# Patient Record
Sex: FEMALE | Race: WHITE | Employment: OTHER | URBAN - METROPOLITAN AREA
[De-identification: names, ages, dates, MRNs, and addresses within clinical notes are randomized per-mention and may not be internally consistent; named-entity substitution may affect disease eponyms.]

---

## 2019-12-26 NOTE — PROGRESS NOTES
Phone message left on only # listed,to call PAT dept at [phone]  for history review and surgery instructions.

## 2019-12-27 RX ORDER — NORETHINDRONE ACETATE AND ETHINYL ESTRADIOL 1; .02 MG/1; MG/1
1 TABLET ORAL EVERY EVENING
COMMUNITY

## 2019-12-27 NOTE — PROGRESS NOTES
C-Difficile admission screening and protocol:     * Admitted with diarrhea? no     *Prior history of C-Diff. In last 3 months? no     *Antibiotic use in the past 6-8 weeks? no     *Prior hospitalization or nursing home in the last month?  no        [address] time___1130 per pt_________        Surgery time___1300______    Take the following medications with a sip of water:n/a    Do not eat or drink anything after 12:00 midnight prior to your surgery. This includes water chewing gum, mints and ice chips. You may brush your teeth and gargle the morning of your surgery, but do not swallow the water     Please see your family doctor/pediatrician for a history and physical and/or concerning medications. Bring any test results/reports from your physicians office. If you are under the care of a heart doctor or specialist doctor, please be aware that you may be asked to them for clearance    You may be asked to stop blood thinners such as Coumadin, Plavix, Fragmin, Lovenox, etc., or any anti-inflammatories such as:  Aspirin, Ibuprofen, Advil, Naproxen prior to your surgery. We also ask that you stop any OTC medications such as fish oil, vitamin E, glucosamine, garlic, Multivitamins, COQ 10, etc.    We ask that you do not smoke 24 hours prior to surgery  We ask that you do not  drink any alcoholic beverages 24 hours prior to surgery     You must make arrangements for a responsible adult to take you home after your surgery. For your safety you will not be allowed to leave alone or drive yourself home. Your surgery will be cancelled if you do not have a ride home. Also for your safety, it is strongly suggested that someone stay with you the first 24 hours after your surgery. A parent or legal guardian must accompany a child scheduled for surgery and plan to stay at the hospital until the child is discharged.     Please do not bring other children with [phone]  Please note these are generalized instructions for all surgical cases, you may be provided with more specific instructions according to your surgery.

## 2019-12-30 ENCOUNTER — ANESTHESIA EVENT (OUTPATIENT)
Dept: OPERATING ROOM | Age: 19
End: 2019-12-30
Payer: COMMERCIAL

## 2019-12-31 ENCOUNTER — ANESTHESIA (OUTPATIENT)
Dept: OPERATING ROOM | Age: 19
End: 2019-12-31
Payer: COMMERCIAL

## 2019-12-31 ENCOUNTER — HOSPITAL ENCOUNTER (OUTPATIENT)
Age: 19
Setting detail: OUTPATIENT SURGERY
Discharge: HOME | End: 2019-12-31
Attending: OBSTETRICS & GYNECOLOGY | Admitting: OBSTETRICS & GYNECOLOGY
Payer: COMMERCIAL

## 2019-12-31 VITALS
HEIGHT: 64 IN | BODY MASS INDEX: 18.78 KG/M2 | HEART RATE: 56 BPM | RESPIRATION RATE: 16 BRPM | DIASTOLIC BLOOD PRESSURE: 67 MMHG | TEMPERATURE: 98.5 F | SYSTOLIC BLOOD PRESSURE: 103 MMHG | OXYGEN SATURATION: 100 % | WEIGHT: 110 LBS

## 2019-12-31 VITALS
DIASTOLIC BLOOD PRESSURE: 54 MMHG | SYSTOLIC BLOOD PRESSURE: 92 MMHG | OXYGEN SATURATION: 98 % | TEMPERATURE: 95.5 F | RESPIRATION RATE: 7 BRPM

## 2019-12-31 PROBLEM — R10.2 PELVIC PAIN IN FEMALE: Status: ACTIVE | Noted: 2019-12-31

## 2019-12-31 LAB
ABO/RH: NORMAL
ANTIBODY SCREEN: NORMAL
PREGNANCY, URINE: NEGATIVE

## 2019-12-31 PROCEDURE — 86900 BLOOD TYPING SEROLOGIC ABO: CPT

## 2019-12-31 PROCEDURE — 2709999900 HC NON-CHARGEABLE SUPPLY: Performed by: OBSTETRICS & GYNECOLOGY

## 2019-12-31 PROCEDURE — 2580000003 HC RX 258: Performed by: ANESTHESIOLOGY

## 2019-12-31 PROCEDURE — 2720000010 HC SURG SUPPLY STERILE: Performed by: OBSTETRICS & GYNECOLOGY

## 2019-12-31 PROCEDURE — 86901 BLOOD TYPING SEROLOGIC RH(D): CPT

## 2019-12-31 PROCEDURE — 3600000004 HC SURGERY LEVEL 4 BASE: Performed by: OBSTETRICS & GYNECOLOGY

## 2019-12-31 PROCEDURE — 86850 RBC ANTIBODY SCREEN: CPT

## 2019-12-31 PROCEDURE — 3700000001 HC ADD 15 MINUTES (ANESTHESIA): Performed by: OBSTETRICS & GYNECOLOGY

## 2019-12-31 PROCEDURE — 2580000003 HC RX 258: Performed by: OBSTETRICS & GYNECOLOGY

## 2019-12-31 PROCEDURE — 6360000002 HC RX W HCPCS: Performed by: NURSE ANESTHETIST, CERTIFIED REGISTERED

## 2019-12-31 PROCEDURE — 84703 CHORIONIC GONADOTROPIN ASSAY: CPT

## 2019-12-31 PROCEDURE — 7100000011 HC PHASE II RECOVERY - ADDTL 15 MIN: Performed by: OBSTETRICS & GYNECOLOGY

## 2019-12-31 PROCEDURE — 2500000003 HC RX 250 WO HCPCS: Performed by: NURSE ANESTHETIST, CERTIFIED REGISTERED

## 2019-12-31 PROCEDURE — 2500000003 HC RX 250 WO HCPCS: Performed by: OBSTETRICS & GYNECOLOGY

## 2019-12-31 PROCEDURE — 7100000000 HC PACU RECOVERY - FIRST 15 MIN: Performed by: OBSTETRICS & GYNECOLOGY

## 2019-12-31 PROCEDURE — 3600000014 HC SURGERY LEVEL 4 ADDTL 15MIN: Performed by: OBSTETRICS & GYNECOLOGY

## 2019-12-31 PROCEDURE — 6360000002 HC RX W HCPCS: Performed by: ANESTHESIOLOGY

## 2019-12-31 PROCEDURE — 7100000010 HC PHASE II RECOVERY - FIRST 15 MIN: Performed by: OBSTETRICS & GYNECOLOGY

## 2019-12-31 PROCEDURE — 7100000001 HC PACU RECOVERY - ADDTL 15 MIN: Performed by: OBSTETRICS & GYNECOLOGY

## 2019-12-31 PROCEDURE — 3700000000 HC ANESTHESIA ATTENDED CARE: Performed by: OBSTETRICS & GYNECOLOGY

## 2019-12-31 RX ORDER — SODIUM CHLORIDE 9 MG/ML
INJECTION, SOLUTION INTRAVENOUS CONTINUOUS
Status: DISCONTINUED | OUTPATIENT
Start: 2019-12-31 | End: 2019-12-31 | Stop reason: HOSPADM

## 2019-12-31 RX ORDER — MEPERIDINE HYDROCHLORIDE 25 MG/ML
12.5 INJECTION INTRAMUSCULAR; INTRAVENOUS; SUBCUTANEOUS EVERY 5 MIN PRN
Status: DISCONTINUED | OUTPATIENT
Start: 2019-12-31 | End: 2019-12-31 | Stop reason: HOSPADM

## 2019-12-31 RX ORDER — KETOROLAC TROMETHAMINE 30 MG/ML
30 INJECTION, SOLUTION INTRAMUSCULAR; INTRAVENOUS ONCE
Status: DISCONTINUED | OUTPATIENT
Start: 2019-12-31 | End: 2019-12-31 | Stop reason: HOSPADM

## 2019-12-31 RX ORDER — ROCURONIUM BROMIDE 10 MG/ML
INJECTION, SOLUTION INTRAVENOUS PRN
Status: DISCONTINUED | OUTPATIENT
Start: 2019-12-31 | End: 2019-12-31 | Stop reason: SDUPTHER

## 2019-12-31 RX ORDER — SUCCINYLCHOLINE/SOD CL,ISO/PF 200MG/10ML
SYRINGE (ML) INTRAVENOUS PRN
Status: DISCONTINUED | OUTPATIENT
Start: 2019-12-31 | End: 2019-12-31 | Stop reason: SDUPTHER

## 2019-12-31 RX ORDER — SODIUM CHLORIDE 0.9 % (FLUSH) 0.9 %
10 SYRINGE (ML) INJECTION PRN
Status: DISCONTINUED | OUTPATIENT
Start: 2019-12-31 | End: 2019-12-31 | Stop reason: HOSPADM

## 2019-12-31 RX ORDER — LABETALOL HYDROCHLORIDE 5 MG/ML
5 INJECTION, SOLUTION INTRAVENOUS EVERY 10 MIN PRN
Status: DISCONTINUED | OUTPATIENT
Start: 2019-12-31 | End: 2019-12-31 | Stop reason: HOSPADM

## 2019-12-31 RX ORDER — SODIUM CHLORIDE 0.9 % (FLUSH) 0.9 %
10 SYRINGE (ML) INJECTION EVERY 12 HOURS SCHEDULED
Status: DISCONTINUED | OUTPATIENT
Start: 2019-12-31 | End: 2019-12-31 | Stop reason: HOSPADM

## 2019-12-31 RX ORDER — MAGNESIUM HYDROXIDE 1200 MG/15ML
LIQUID ORAL CONTINUOUS PRN
Status: COMPLETED | OUTPATIENT
Start: 2019-12-31 | End: 2019-12-31

## 2019-12-31 RX ORDER — OXYCODONE HYDROCHLORIDE AND ACETAMINOPHEN 5; 325 MG/1; MG/1
1 TABLET ORAL PRN
Status: DISCONTINUED | OUTPATIENT
Start: 2019-12-31 | End: 2019-12-31 | Stop reason: HOSPADM

## 2019-12-31 RX ORDER — FENTANYL CITRATE 50 UG/ML
INJECTION, SOLUTION INTRAMUSCULAR; INTRAVENOUS PRN
Status: DISCONTINUED | OUTPATIENT
Start: 2019-12-31 | End: 2019-12-31 | Stop reason: SDUPTHER

## 2019-12-31 RX ORDER — DEXAMETHASONE SODIUM PHOSPHATE 4 MG/ML
INJECTION, SOLUTION INTRA-ARTICULAR; INTRALESIONAL; INTRAMUSCULAR; INTRAVENOUS; SOFT TISSUE PRN
Status: DISCONTINUED | OUTPATIENT
Start: 2019-12-31 | End: 2019-12-31 | Stop reason: SDUPTHER

## 2019-12-31 RX ORDER — MIDAZOLAM HYDROCHLORIDE 1 MG/ML
INJECTION INTRAMUSCULAR; INTRAVENOUS PRN
Status: DISCONTINUED | OUTPATIENT
Start: 2019-12-31 | End: 2019-12-31 | Stop reason: SDUPTHER

## 2019-12-31 RX ORDER — DIPHENHYDRAMINE HYDROCHLORIDE 50 MG/ML
INJECTION INTRAMUSCULAR; INTRAVENOUS PRN
Status: DISCONTINUED | OUTPATIENT
Start: 2019-12-31 | End: 2019-12-31 | Stop reason: SDUPTHER

## 2019-12-31 RX ORDER — PROPOFOL 10 MG/ML
INJECTION, EMULSION INTRAVENOUS PRN
Status: DISCONTINUED | OUTPATIENT
Start: 2019-12-31 | End: 2019-12-31 | Stop reason: SDUPTHER

## 2019-12-31 RX ORDER — MORPHINE SULFATE 2 MG/ML
2 INJECTION, SOLUTION INTRAMUSCULAR; INTRAVENOUS EVERY 5 MIN PRN
Status: DISCONTINUED | OUTPATIENT
Start: 2019-12-31 | End: 2019-12-31 | Stop reason: HOSPADM

## 2019-12-31 RX ORDER — LIDOCAINE HYDROCHLORIDE 20 MG/ML
INJECTION, SOLUTION EPIDURAL; INFILTRATION; INTRACAUDAL; PERINEURAL PRN
Status: DISCONTINUED | OUTPATIENT
Start: 2019-12-31 | End: 2019-12-31 | Stop reason: SDUPTHER

## 2019-12-31 RX ORDER — OXYCODONE HYDROCHLORIDE AND ACETAMINOPHEN 5; 325 MG/1; MG/1
2 TABLET ORAL PRN
Status: DISCONTINUED | OUTPATIENT
Start: 2019-12-31 | End: 2019-12-31 | Stop reason: HOSPADM

## 2019-12-31 RX ORDER — BUPIVACAINE HYDROCHLORIDE 5 MG/ML
INJECTION, SOLUTION EPIDURAL; INTRACAUDAL
Status: COMPLETED | OUTPATIENT
Start: 2019-12-31 | End: 2019-12-31

## 2019-12-31 RX ORDER — HYDRALAZINE HYDROCHLORIDE 20 MG/ML
5 INJECTION INTRAMUSCULAR; INTRAVENOUS
Status: DISCONTINUED | OUTPATIENT
Start: 2019-12-31 | End: 2019-12-31 | Stop reason: HOSPADM

## 2019-12-31 RX ORDER — ONDANSETRON 2 MG/ML
INJECTION INTRAMUSCULAR; INTRAVENOUS PRN
Status: DISCONTINUED | OUTPATIENT
Start: 2019-12-31 | End: 2019-12-31 | Stop reason: SDUPTHER

## 2019-12-31 RX ORDER — ONDANSETRON 2 MG/ML
4 INJECTION INTRAMUSCULAR; INTRAVENOUS
Status: DISCONTINUED | OUTPATIENT
Start: 2019-12-31 | End: 2019-12-31 | Stop reason: HOSPADM

## 2019-12-31 RX ORDER — MORPHINE SULFATE 2 MG/ML
1 INJECTION, SOLUTION INTRAMUSCULAR; INTRAVENOUS EVERY 5 MIN PRN
Status: DISCONTINUED | OUTPATIENT
Start: 2019-12-31 | End: 2019-12-31 | Stop reason: HOSPADM

## 2019-12-31 RX ADMIN — SODIUM CHLORIDE: 9 INJECTION, SOLUTION INTRAVENOUS at 12:17

## 2019-12-31 RX ADMIN — ONDANSETRON 4 MG: 2 INJECTION INTRAMUSCULAR; INTRAVENOUS at 13:18

## 2019-12-31 RX ADMIN — MIDAZOLAM 2 MG: 1 INJECTION INTRAMUSCULAR; INTRAVENOUS at 13:06

## 2019-12-31 RX ADMIN — MEPERIDINE HYDROCHLORIDE 12.5 MG: 25 INJECTION INTRAMUSCULAR; INTRAVENOUS; SUBCUTANEOUS at 14:09

## 2019-12-31 RX ADMIN — Medication 160 MG: at 13:11

## 2019-12-31 RX ADMIN — LIDOCAINE HYDROCHLORIDE 100 MG: 20 INJECTION, SOLUTION EPIDURAL; INFILTRATION; INTRACAUDAL; PERINEURAL at 13:10

## 2019-12-31 RX ADMIN — FENTANYL CITRATE 100 MCG: 50 INJECTION INTRAMUSCULAR; INTRAVENOUS at 13:10

## 2019-12-31 RX ADMIN — ROCURONIUM BROMIDE 20 MG: 10 INJECTION INTRAVENOUS at 13:18

## 2019-12-31 RX ADMIN — SUGAMMADEX 200 MG: 100 INJECTION, SOLUTION INTRAVENOUS at 13:34

## 2019-12-31 RX ADMIN — SODIUM CHLORIDE: 9 INJECTION, SOLUTION INTRAVENOUS at 14:26

## 2019-12-31 RX ADMIN — DIPHENHYDRAMINE HYDROCHLORIDE 12.5 MG: 50 INJECTION, SOLUTION INTRAMUSCULAR; INTRAVENOUS at 13:18

## 2019-12-31 RX ADMIN — HYDROMORPHONE HYDROCHLORIDE 0.5 MG: 1 INJECTION, SOLUTION INTRAMUSCULAR; INTRAVENOUS; SUBCUTANEOUS at 14:17

## 2019-12-31 RX ADMIN — PROPOFOL 200 MG: 10 INJECTION, EMULSION INTRAVENOUS at 13:10

## 2019-12-31 RX ADMIN — FAMOTIDINE 20 MG: 10 INJECTION, SOLUTION INTRAVENOUS at 13:18

## 2019-12-31 RX ADMIN — DEXAMETHASONE SODIUM PHOSPHATE 10 MG: 4 INJECTION, SOLUTION INTRAMUSCULAR; INTRAVENOUS at 13:18

## 2019-12-31 ASSESSMENT — PULMONARY FUNCTION TESTS
PIF_VALUE: 13
PIF_VALUE: 10
PIF_VALUE: 13
PIF_VALUE: 1
PIF_VALUE: 10
PIF_VALUE: 10
PIF_VALUE: 2
PIF_VALUE: 13
PIF_VALUE: 14
PIF_VALUE: 10
PIF_VALUE: 11
PIF_VALUE: 10
PIF_VALUE: 2
PIF_VALUE: 18
PIF_VALUE: 2
PIF_VALUE: 10
PIF_VALUE: 1
PIF_VALUE: 0
PIF_VALUE: 11
PIF_VALUE: 13
PIF_VALUE: 10
PIF_VALUE: 5
PIF_VALUE: 13
PIF_VALUE: 10
PIF_VALUE: 14
PIF_VALUE: 10
PIF_VALUE: 10
PIF_VALUE: 1
PIF_VALUE: 13
PIF_VALUE: 10
PIF_VALUE: 13

## 2019-12-31 ASSESSMENT — PAIN - FUNCTIONAL ASSESSMENT: PAIN_FUNCTIONAL_ASSESSMENT: 0-10

## 2019-12-31 ASSESSMENT — PAIN DESCRIPTION - PAIN TYPE
TYPE: SURGICAL PAIN

## 2019-12-31 ASSESSMENT — PAIN DESCRIPTION - PROGRESSION
CLINICAL_PROGRESSION: NOT CHANGED
CLINICAL_PROGRESSION: NOT CHANGED
CLINICAL_PROGRESSION: GRADUALLY IMPROVING
CLINICAL_PROGRESSION: GRADUALLY IMPROVING

## 2019-12-31 ASSESSMENT — PAIN DESCRIPTION - DESCRIPTORS
DESCRIPTORS: CRAMPING
DESCRIPTORS: ACHING;DISCOMFORT
DESCRIPTORS: ACHING
DESCRIPTORS: ACHING
DESCRIPTORS: CRAMPING;PRESSURE

## 2019-12-31 ASSESSMENT — PAIN SCALES - GENERAL
PAINLEVEL_OUTOF10: 7
PAINLEVEL_OUTOF10: 4
PAINLEVEL_OUTOF10: 5
PAINLEVEL_OUTOF10: 7
PAINLEVEL_OUTOF10: 4

## 2019-12-31 ASSESSMENT — PAIN DESCRIPTION - FREQUENCY
FREQUENCY: CONTINUOUS

## 2019-12-31 ASSESSMENT — PAIN DESCRIPTION - ORIENTATION
ORIENTATION: RIGHT;LOWER
ORIENTATION: LOWER
ORIENTATION: RIGHT;LOWER

## 2019-12-31 ASSESSMENT — PAIN DESCRIPTION - LOCATION
LOCATION: ABDOMEN

## 2019-12-31 ASSESSMENT — PAIN DESCRIPTION - ONSET
ONSET: ON-GOING

## 2019-12-31 ASSESSMENT — PAIN SCALES - WONG BAKER: WONGBAKER_NUMERICALRESPONSE: 4

## 2019-12-31 NOTE — PROGRESS NOTES
Vaginal Sweep Documentation     Intraop skin prep sponge count correct, verified by BG and NP. Vaginal sweep performed by Dr Sammy Woodall at 0693. No foreign objects or vaginal tears noted.

## 2019-12-31 NOTE — OP NOTE
OPERATIVE NOTE    Date of surgery:   Preoperative Diagnosis: 1. Chronic pelvic pain  Postoperative Diagnosis: SAME  Procedure: 1. Diagnostic laparoscopy  Surgeon: Dr Haley Benitez  Anesthesia: General  Antibiotics: none indicated  Findings: Anteverted uterus on bimanual examination with good mobility. Normal appearing uterus, tubes, and ovaries bilaterally on laparoscopy. No endometriotic implants or adhesions noted. Complications: none  Specimens: none  Urine Output: less than 50ml mL  Estimated blood loss: less than 5ml mL    Indications: The patient is a 21 yo female with a long standing history of chronic pelvic pain who has failed hormonal management. She was offered Lupron vs. Beverly Suni prior to proceeding with diagnostic laparoscopy, however patient desired surgery for possible definitive diagnosis. Prior to the surgery we discussed at length the possible risks of surgery which included, but were not limited to risks of injury to internal organs, that may lead to additional surgery, infection, bleeding, risk of persistent pelvic pain, blood clot, risk of possible conversion to laparotomy, risk of losing the tube or ovary, anesthesia complications. After answering all appropriate questions, patient agreed to proceed and informed consent was signed    Procedure: The patient was taken to the operating room with IV running and pneumoboots applied to her lower extremities. General anesthesia was accomplished without difficulty and was adequate. She was then placed in a dorsal supine position with her legs in [address] [place] [address]. Exam under anesthesia revealed normal size uterus, mobile, anteverted, with no identifiable masses in adnexal areas. The patient was prepped and draped in a regular sterile fashion. A Hayden catheter was placed in her bladder and the bladder was emptied. Time out was performed. Hayden was anchored, OG tube placement was confirmed. A Hulka uterine manipulator was placed in the uterus.      Pneumoperitoneum

## 2019-12-31 NOTE — H&P
H&P Update    Pt Name: Taina Noonan  MRN: [id number]  YOB: 2000  Date of evaluation: 12/31/2019    [x] I have examined the patient and reviewed the H&P/Consult and there are no changes to the patient or plans.     [] I have examined the patient and reviewed the H&P/Consult and have noted the following changes:            Marlen Hogan MD  Electronically signed 12/31/2019 at 12:28 PM

## 2019-12-31 NOTE — PROGRESS NOTES
Pt open eyes and says \"I don't feel well. \" Pt unable to describe how she is feeling. Pt shivers. Covered with warm blanket. O2 off.

## 2019-12-31 NOTE — PROGRESS NOTES
CLINICAL PHARMACY NOTE: MEDS TO [address] Select Patient?: No  Total # of Prescriptions Filled: 3   The following medications were delivered to the patient:  · Ibuprofen 600mg  · Oxycodone/APAP 5/325  · Docusate 100mg  Total # of Interventions Completed: 1  Time Spent (min): 30    Additional Documentation:

## 2020-02-11 ENCOUNTER — HOSPITAL ENCOUNTER (OUTPATIENT)
Dept: PHYSICAL THERAPY | Age: 20
Setting detail: THERAPIES SERIES
Discharge: HOME | End: 2020-02-11
Payer: COMMERCIAL

## 2020-02-11 PROCEDURE — 97163 PT EVAL HIGH COMPLEX 45 MIN: CPT | Performed by: PHYSICAL THERAPIST

## 2020-02-11 PROCEDURE — 97530 THERAPEUTIC ACTIVITIES: CPT | Performed by: PHYSICAL THERAPIST

## 2020-02-11 PROCEDURE — 97110 THERAPEUTIC EXERCISES: CPT | Performed by: PHYSICAL THERAPIST

## 2020-02-18 ENCOUNTER — HOSPITAL ENCOUNTER (OUTPATIENT)
Dept: PHYSICAL THERAPY | Age: 20
Setting detail: THERAPIES SERIES
Discharge: HOME | End: 2020-02-18
Payer: COMMERCIAL

## 2020-02-18 PROCEDURE — 97530 THERAPEUTIC ACTIVITIES: CPT | Performed by: PHYSICAL THERAPIST

## 2020-02-18 PROCEDURE — 97110 THERAPEUTIC EXERCISES: CPT | Performed by: PHYSICAL THERAPIST

## 2020-02-18 NOTE — PROGRESS NOTES
Admits to not taking breaks during schoolwork to go to the bathroom or eat. Patient again verbally consented to transvaginal pelvic floor muscle assessment revealed mildly tight and tender muscles in R lateral pelvic floor and moderately tight and tender muscles in L pelvic floor with increased muscle bulk, which responded fairly well to continued attempts with myofascial release and gentle massage. Reviewed/continued education in diaphragmatic breathing, relaxation, downtraining, and stretching of pevlic floor and surrounding muscles, including bilateral LEs with additional stretches for hip muscles and dilator for specific PF stretches. Patient would continue to benefit from additional pelvic floor therapy for manual techniques and to address downtraining of pelvic floor muscles and overall relaxation, as well as stretching of LE muscles and addressing any core stabilization issues. Patient would also benefit from learning behavioral modification strategies to improve her overall ability to relax and thus promote pain reduction.    Treatment Diagnosis: impaired muscle control with significant tightness, spasms, and pain in pelvic floor muscles, limited knowlege and understanding of downtraining to relax pelvic floor muscles decreasing tension and therefore pain  Prognosis: Fair;Good  Decision Making: High Complexity  History: PMH: asthma, alergies - sumatriptan, headaches, sports injuries, smoking history, vaginal dryness, painful periods, painful vaginal penetration, pelvic pain  Exam: impaired muscle control with significant tightness, spasms, and pain in pelvic floor muscles, limited knowlege and understanding of downtraining to relax pelvic floor muscles decreasing tension and therefore pain  Clinical Presentation: worsening clinical symptoms, present most of her adolescent life but became worse in Oct 2017  Barriers to Learning: pain  REQUIRES PT FOLLOW UP: Yes  Treatment Initiated : 2/11/2020 - manual

## 2020-02-25 ENCOUNTER — HOSPITAL ENCOUNTER (OUTPATIENT)
Dept: PHYSICAL THERAPY | Age: 20
Setting detail: THERAPIES SERIES
Discharge: HOME | End: 2020-02-25
Payer: COMMERCIAL

## 2020-02-25 PROCEDURE — 97530 THERAPEUTIC ACTIVITIES: CPT | Performed by: PHYSICAL THERAPIST

## 2020-02-25 PROCEDURE — 97110 THERAPEUTIC EXERCISES: CPT | Performed by: PHYSICAL THERAPIST

## 2020-02-25 NOTE — PROGRESS NOTES
Physical Therapy  Women's Health - Pelvic Floor      Daily Treatment Note  Date: 2020  Patient Name: Wilma Ventura  MRN: [id number]     :   2000    Subjective:   General  Additional Pertinent Hx: PMH: asthma, alergies - sumatriptan, headaches, sports injuries, smoking history, vaginal dryness, painful periods, painful vaginal penetration, pelvic pain  Referring Practitioner: Dion Cueto MD  PT Visit Information  PT Insurance Information: [employer]  Total # of Visits Approved: 25  Total # of Visits to Date: 3  Subjective  Subjective: Patient reports having strep throat over this past weekend. Antibiotic has produced loose stools, denies any pain with bowel movements but not formed stool. Patient reports that she continues with light mestrual flow, inconsistent since surgery in 2019. Used dilator only about twice since last week and is uncertain if she is using it correctly. Treatment Activities:   Manual therapy  Soft Tissue Mobalization: Initiated with external manual techniques to promote more relaxation prior to attempting transvaginal techniques - Manual stretch, deep massage, and cross hand myofascial release to bilateral hip adductors/piriformis/ lateral pelvic floor muscles while patient was maintaining strectches with each LE. Transvaginal manual stretching with gentle massage initially to superficial and progressed to deeper pelvic floor muscles posteriorly and laterally, avoiding pressure directly anteriorly due to bulge and discomfort around urethera. Demonstrated moderate to severe tightness and tenderness in deeper structures in L PF, but mild tightness/tenderness in R PF compared to L both superficially and deep with palpation of levator ani. Encouraged deep breathing with focused tension mangement and downtraining specific to PF muscles.    Other: Continued to encourage regular stretching with XS dilator - intructed to insert posterior and to R side, stretch laterally in 3 and 9 positions, and diagonally in 5 and 7 positions. REviewed need for correct pressure to obtain stretch for superficial PF muscle. Instructed patient on possible use of tennis ball to perform self massage to area around piriformis while seated and clothed. Exercises  Exercise 1: Diaphragmatic breathing techniques - encouraged in various positions throughout session - for pelvic floor relaxation/descent with inhalation - encouraged patient to perform deep breathing throughout the day for tension management/stress relief  Exercise 3: Piriformis stretch - supine - 2+ minute hold - during manual techniques - BLEs - cues for proper technique - noted increased tightness on left compared to right  Exercise 4: Lateral piriformis stretch - supine - 2+ minute hold - during deep tissue massage of hip/piriformis muscles followed by myofascial release  Exercise 5: Hip adductor stretch - supine - 2+ minute hold - during manual techniques - BLEs - cues for proper technique   Exercise 6: Core stabilization - hooklying/supine - posterior pelvic tilt in isolation and then with alternating shoulder flex, and hip flex, suggested when patient feels able to attempt opposing shoulder/hip flex x 10 reps each LE. Tactile and veral cues to ensure maintains posterior pelvic tilt throughout. Assessment:   Conditions Requiring Skilled Therapeutic Intervention  Body structures, Functions, Activity limitations: Increased pain;Decreased ROM; Decreased strength;Decreased coordination;Decreased functional mobility (in regards to pelvic floor muscles)  Assessment: Patient reports performing yoga once a day as instructed, but has used dilator for stretching superficial PF muscles only twice - seems uncertain on how to use the dilator effectively. Discussed ongoing anxiety, for which patient currently see a psychologist about once a week.   Shared that she has been having pain with penetration for at least the last 3-3.5 years and that there was no endometriosis noted with her laproscopy in December 2019, which is why she was referred to PF PT. Patient again verbally consented to transvaginal pelvic floor muscle assessment which continues to reveal only mildly tight and tender muscles in R lateral pelvic floor but moderately to severely tight and tender muscles in L pelvic floor with increased muscle bulk, which responded fairly well to continued attempts with myofascial release and gentle massage. Reviewed/continued education in diaphragmatic breathing, relaxation, downtraining, and stretching of pevlic floor and surrounding muscles, including bilateral LEs with additional stretches for hip muscles and dilator for specific PF stretches. Able to initiate core stabilization activities in supine/hooklying. Patient would continue to benefit from additional pelvic floor therapy for manual techniques and to address downtraining of pelvic floor muscles and overall relaxation, as well as stretching of LE muscles and addressing any core stabilization issues. Patient would also benefit from learning behavioral modification strategies to improve her overall ability to relax and thus promote pain reduction.    Treatment Diagnosis: impaired muscle control with significant tightness, spasms, and pain in pelvic floor muscles, limited knowlege and understanding of downtraining to relax pelvic floor muscles decreasing tension and therefore pain  Prognosis: Fair;Good  Decision Making: High Complexity  History: PMH: asthma, alergies - sumatriptan, headaches, sports injuries, smoking history, vaginal dryness, painful periods, painful vaginal penetration, pelvic pain  Exam: impaired muscle control with significant tightness, spasms, and pain in pelvic floor muscles, limited knowlege and understanding of downtraining to relax pelvic floor muscles decreasing tension and therefore pain  Clinical for exercise/ activities with less urinary incontinence, constipation and discomfort/pain during BMs. (104.16 disability index at initial eval)  Long term goal 3: Perform HEP for pelvic floor and core muscle groups independently as she progresses to self-manage her pelvic pain/spasms. Long term goal 4: Rate severity of the problem related to pelvic pain to 3 or less on scale of 0-10 with 0 being no problem and 10 being the worst - (7/10 reported at intial eval). Long term goal 5: Improve FSFI score to at least 28/36 (14/36 at initial eval) indicating less issues with impaired quality of life due to pelvic pain, especially with vagainal penetration for medical exams and intercourse. Patient Goals   Patient goals : Patient's goals: \"help with pain during BM and intercourse and help with urine leakage. \"    Plan:    Plan  Specific instructions for Next Treatment: address tightness in pelvic floor muscles, address core stabilization activities, transvaginal pelvic floor muscle assessment and manual treatment  Current Treatment Recommendations: Neuromuscular Re-education, Manual Therapy - Soft Tissue Mobilization, Pain Management, Home Exercise Program, Safety Education & Training, Patient/Caregiver Education & Training, Equipment Evaluation, Education, & procurement, Modalities, Positioning  Plan Comment: See patient every 1-2 weeks initially to ensure properly addressing need for downtraining with manual therpy techniques, then taper frequency based on patient's response to treatment. Patient's next scheduled visit is on Tuesday, 3/10/2020 at 12PM.   Timed Code Treatment Minutes: 70 Minutes     Therapy Time   Individual Concurrent Group Co-treatment   Time In 1100         Time Out 1210         Minutes 70         Timed Code Treatment Minutes: 70 Minutes      Charges:   Therapeutic activity x 3  Therapeutic exercise x 2      Christian Dunn, PT #[id number]

## 2020-03-10 ENCOUNTER — HOSPITAL ENCOUNTER (OUTPATIENT)
Dept: PHYSICAL THERAPY | Age: 20
Setting detail: THERAPIES SERIES
Discharge: HOME | End: 2020-03-10
Payer: COMMERCIAL

## 2020-03-10 PROCEDURE — 97110 THERAPEUTIC EXERCISES: CPT | Performed by: PHYSICAL THERAPIST

## 2020-03-10 PROCEDURE — 97140 MANUAL THERAPY 1/> REGIONS: CPT | Performed by: PHYSICAL THERAPIST

## 2020-03-10 PROCEDURE — 97530 THERAPEUTIC ACTIVITIES: CPT | Performed by: PHYSICAL THERAPIST

## 2020-03-10 NOTE — PROGRESS NOTES
Physical Therapy  Women's Health - Pelvic Floor      Daily Treatment Note  Date: 3/10/2020  Patient Name: Aditi Mcguire  MRN: [id number]     :   2000    Subjective:   General  Additional Pertinent Hx: PMH: asthma, alergies - sumatriptan, headaches, sports injuries, smoking history, vaginal dryness, painful periods, painful vaginal penetration, pelvic pain  Referring Practitioner: Kimmie Gibson MD  PT Visit Information  PT Insurance Information: [employer]  Total # of Visits Approved: 25  Total # of Visits to Date: 4  Subjective  Subjective: Patient reports that she had light bleeding after her laproscopy 2019, which resolved. The bleeding began again  or , just after her first PF PT appointment and she has had variable heaviness with her bleeding pattens - has not had PF PT for the last two weeks, but started with heavy bleeding within the last couple of days. Patient is contemplating contacting her OB/GYN, which PT encouraged patient to do. Reports that she has had vaginal intercourse twice since last session - first time with pain during, but no pain afterwards which is an improvement; second time - had orgasm first, then vaginal penetration for intercourse was not painful during or after penetration. Overall, reports improvement. Treatment Activities:   Manual therapy  Soft Tissue Mobalization: All external manual techniques to promote more relaxation - did not attempt transvaginal techniques due to bleeding per patient' report. Manual stretch, deep massage, and cross hand myofascial release to bilateral hip adductors/piriformis/lateral pelvic floor muscles while patient was maintaining strectches with each LE. Cross friction massage overal incisional scar in R lower abdomen, then adominal skin rolling followed by myofasical release.   Prone external myofascial release to levator ani and obturator internus (OI) with increased discomfort in OI compared to levator ani and more bleeding. Responded fairly well to external myofascial release and gentle massage techniques in abdomen, hip adductors, and gluts/buttocks. Reviewed/continued education in diaphragmatic breathing, relaxation, downtraining, and stretching of pevlic floor and surrounding muscles, including bilateral LEs with additional stretches for hip muscles and dilator for specific PF stretches. Patient is making progress toward goals with less pain during vaginal penetration, but would continue to benefit from additional pelvic floor therapy for manual techniques and to address downtraining of pelvic floor muscles and overall relaxation, as well as stretching of LE muscles and addressing any core stabilization issues. Patient would also benefit from learning behavioral modification strategies to improve her overall ability to relax and thus promote pain reduction.    Treatment Diagnosis: impaired muscle control with significant tightness, spasms, and pain in pelvic floor muscles, limited knowlege and understanding of downtraining to relax pelvic floor muscles decreasing tension and therefore pain  Prognosis: Fair;Good  Decision Making: High Complexity  History: PMH: asthma, alergies - sumatriptan, headaches, sports injuries, smoking history, vaginal dryness, painful periods, painful vaginal penetration, pelvic pain  Exam: impaired muscle control with significant tightness, spasms, and pain in pelvic floor muscles, limited knowlege and understanding of downtraining to relax pelvic floor muscles decreasing tension and therefore pain  Clinical Presentation: worsening clinical symptoms, present most of her adolescent life but became worse in Oct 2017  Barriers to Learning: pain  REQUIRES PT FOLLOW UP: Yes  Treatment Initiated : 2/11/2020 - manual treatment for pelvic pain, initiated strecthing for LEs/muscles attached to pelvis, pelvic floor muscles relaxation/tension management      Goals:  Short term goals  Time Frame for Short term goals: Patient will in 6-8 sessions: ONGOING  Short term goal 1: Report using 1-2 behavorial modification strategies to decrease pain in perineum/pelvic floor region, with less pain/discomfort during bowel movements and tolerate socially appropriate activities for patient's age. - met - 3/10/2020  Short term goal 2: Improve score on PFDI-20 to 80 or less, demonstrating improved tolerance for exercise/ activities with less urinary incontinence, constipation and discomfort/pain during BMs. (104.16 disability index at initial eval)  Short term goal 3: Perform HEP for pelvic floor, hip/lower extremity, and core muscle groups with minimal direction from therapist as she progresses to become more independent managing her pelvic pain/spasms. Short term goal 4: Rate severity of the problem related to pelvic pain to 5 or less on scale of 0-10 with 0 being no problem and 10 being the worst - (7/10 reported at intial eval). Short term goal 5: Improve FSFI score to at least 20/36 (14/36 at initial eval) indicating less issues with impaired quality of life due to pelvic pain, especially with vagainal penetration for medical exams and intercourse. Long term goals  Time Frame for Long term goals : Patient will in 10-15 sessions:   Long term goal 1: Report using 3-4 behavorial modification strategies to decrease pain in perineum/pelvic floor region, with less pain/discomfort during bowel movements and tolerate socially appropriate activities for patient's age. Long term goal 2: Improve score on PFDI-20 to 60 or less, demonstrating improved tolerance for exercise/ activities with less urinary incontinence, constipation and discomfort/pain during BMs. (104.16 disability index at initial eval)  Long term goal 3: Perform HEP for pelvic floor and core muscle groups independently as she progresses to self-manage her pelvic pain/spasms.    Long term goal 4: Rate severity of the problem related to pelvic pain to 3 or less on

## 2021-04-19 ENCOUNTER — HOSPITAL ENCOUNTER (EMERGENCY)
Age: 21
Discharge: HOME | End: 2021-04-19
Attending: EMERGENCY MEDICINE
Payer: COMMERCIAL

## 2021-04-19 ENCOUNTER — APPOINTMENT (OUTPATIENT)
Dept: GENERAL RADIOLOGY | Age: 21
End: 2021-04-19
Payer: COMMERCIAL

## 2021-04-19 VITALS
SYSTOLIC BLOOD PRESSURE: 120 MMHG | DIASTOLIC BLOOD PRESSURE: 90 MMHG | WEIGHT: 105 LBS | HEIGHT: 64 IN | BODY MASS INDEX: 17.93 KG/M2 | RESPIRATION RATE: 18 BRPM | TEMPERATURE: 98.8 F | HEART RATE: 59 BPM | OXYGEN SATURATION: 98 %

## 2021-04-19 DIAGNOSIS — R00.2 PALPITATIONS: Primary | ICD-10-CM

## 2021-04-19 LAB
ANION GAP SERPL CALCULATED.3IONS-SCNC: 11 MMOL/L (ref 3–16)
BASOPHILS ABSOLUTE: 0.1 K/UL (ref 0–0.2)
BASOPHILS RELATIVE PERCENT: 0.9 %
BUN BLDV-MCNC: 7 MG/DL (ref 7–20)
CALCIUM SERPL-MCNC: 8.8 MG/DL (ref 8.3–10.6)
CHLORIDE BLD-SCNC: 101 MMOL/L (ref 99–110)
CO2: 21 MMOL/L (ref 21–32)
CREAT SERPL-MCNC: 0.8 MG/DL (ref 0.6–1.1)
EOSINOPHILS ABSOLUTE: 0.1 K/UL (ref 0–0.6)
EOSINOPHILS RELATIVE PERCENT: 0.9 %
GFR AFRICAN AMERICAN: >60
GFR NON-AFRICAN AMERICAN: >60
GLUCOSE BLD-MCNC: 132 MG/DL (ref 70–99)
HCT VFR BLD CALC: 40.2 % (ref 36–48)
HEMOGLOBIN: 14.2 G/DL (ref 12–16)
LYMPHOCYTES ABSOLUTE: 3.4 K/UL (ref 1–5.1)
LYMPHOCYTES RELATIVE PERCENT: 37.6 %
MAGNESIUM: 1.9 MG/DL (ref 1.8–2.4)
MCH RBC QN AUTO: 30.7 PG (ref 26–34)
MCHC RBC AUTO-ENTMCNC: 35.3 G/DL (ref 31–36)
MCV RBC AUTO: 87 FL (ref 80–100)
MONOCYTES ABSOLUTE: 0.7 K/UL (ref 0–1.3)
MONOCYTES RELATIVE PERCENT: 7.9 %
NEUTROPHILS ABSOLUTE: 4.8 K/UL (ref 1.7–7.7)
NEUTROPHILS RELATIVE PERCENT: 52.7 %
PDW BLD-RTO: 13 % (ref 12.4–15.4)
PLATELET # BLD: 247 K/UL (ref 135–450)
PMV BLD AUTO: 8.7 FL (ref 5–10.5)
POTASSIUM REFLEX MAGNESIUM: 3.3 MMOL/L (ref 3.5–5.1)
RBC # BLD: 4.62 M/UL (ref 4–5.2)
SODIUM BLD-SCNC: 133 MMOL/L (ref 136–145)
WBC # BLD: 9.2 K/UL (ref 4–11)

## 2021-04-19 PROCEDURE — 99283 EMERGENCY DEPT VISIT LOW MDM: CPT

## 2021-04-19 PROCEDURE — 93005 ELECTROCARDIOGRAM TRACING: CPT | Performed by: EMERGENCY MEDICINE

## 2021-04-19 PROCEDURE — 71046 X-RAY EXAM CHEST 2 VIEWS: CPT

## 2021-04-19 PROCEDURE — 85025 COMPLETE CBC W/AUTO DIFF WBC: CPT

## 2021-04-19 PROCEDURE — 84443 ASSAY THYROID STIM HORMONE: CPT

## 2021-04-19 PROCEDURE — 83735 ASSAY OF MAGNESIUM: CPT

## 2021-04-19 PROCEDURE — 2580000003 HC RX 258: Performed by: STUDENT IN AN ORGANIZED HEALTH CARE EDUCATION/TRAINING PROGRAM

## 2021-04-19 PROCEDURE — 36415 COLL VENOUS BLD VENIPUNCTURE: CPT

## 2021-04-19 PROCEDURE — 80048 BASIC METABOLIC PNL TOTAL CA: CPT

## 2021-04-19 RX ORDER — FLUTICASONE PROPIONATE 50 MCG
2 SPRAY, SUSPENSION (ML) NASAL DAILY
COMMUNITY
Start: 2020-06-10

## 2021-04-19 RX ORDER — SODIUM CHLORIDE, SODIUM LACTATE, POTASSIUM CHLORIDE, CALCIUM CHLORIDE 600; 310; 30; 20 MG/100ML; MG/100ML; MG/100ML; MG/100ML
1000 INJECTION, SOLUTION INTRAVENOUS ONCE
Status: COMPLETED | OUTPATIENT
Start: 2021-04-19 | End: 2021-04-19

## 2021-04-19 RX ADMIN — SODIUM CHLORIDE, SODIUM LACTATE, POTASSIUM CHLORIDE, AND CALCIUM CHLORIDE 1000 ML: .6; .31; .03; .02 INJECTION, SOLUTION INTRAVENOUS at 21:27

## 2021-04-19 ASSESSMENT — ENCOUNTER SYMPTOMS
BACK PAIN: 1
VOMITING: 0
DIARRHEA: 0
TROUBLE SWALLOWING: 0
EYE PAIN: 0
SHORTNESS OF BREATH: 0
COUGH: 0
ABDOMINAL PAIN: 0
NAUSEA: 0
EYE REDNESS: 0
BLOOD IN STOOL: 0
SORE THROAT: 0
RHINORRHEA: 0

## 2021-04-20 LAB
EKG ATRIAL RATE: 82 BPM
EKG DIAGNOSIS: NORMAL
EKG P AXIS: 79 DEGREES
EKG P-R INTERVAL: 146 MS
EKG Q-T INTERVAL: 388 MS
EKG QRS DURATION: 96 MS
EKG QTC CALCULATION (BAZETT): 453 MS
EKG R AXIS: 95 DEGREES
EKG T AXIS: 54 DEGREES
EKG VENTRICULAR RATE: 82 BPM
TSH REFLEX: 3.59 UIU/ML (ref 0.27–4.2)

## 2021-04-20 NOTE — ED TRIAGE NOTES
Pt presents to ED with c/o \"heart beating heavy\" but not fast. States that she started taking Zoloft recently, but has taken it before without any complications. Also states that she stopped using E cigarettes within the last 2 weeks.

## 2021-04-20 NOTE — ED PROVIDER NOTES
[address] RESIDENT NOTE       Date of evaluation: 4/19/2021    Chief Complaint     Palpitations (\"heart beating heavy\")      History of Present Illness     Hilda Blanco is a 19 y.o. female who presents to the emergency department for palpitations. Patient has a history of anxiety and depression for which she takes sertraline and just restarted taking this medication in the last week. Patient also has a history of heavy menstrual bleeding for which she takes estrogen and progesterone OCPs. Patient reports that earlier today while she was sitting in class, she had a sudden onset sensation of her heart beating fast like when she is exercising. This was accompanied by intermittent episodes of pain in her midline back which was nonradiating and moderate in severity. She reports that she continues to have a sensation like her heart is beating quickly. She has tried to take deep breaths without improvement in her symptoms. Patient had 1 similar episode 2 years ago when she was taking sumatriptan she has had her normal caffeine intake today which is a cup of coffee and a glass of soda. She does not smoke cigarettes or use illicit substances, over-the-counter dietary supplements or exercise supplements. She has no other symptoms and no recent illness. Does report decreased appetite over the last 24 to 48 hours. Review of Systems     Review of Systems   Constitutional: Negative for chills, fatigue, fever and unexpected weight change. HENT: Negative for congestion, ear pain, rhinorrhea, sore throat and trouble swallowing. Eyes: Negative for pain and redness. Respiratory: Negative for cough and shortness of breath. No hemoptysis   Cardiovascular: Positive for palpitations. Negative for chest pain and leg swelling. Gastrointestinal: Negative for abdominal pain, blood in stool, diarrhea, nausea and vomiting.    Genitourinary: Negative for dysuria and hematuria. Musculoskeletal: Positive for back pain. Negative for myalgias. Skin: Negative for rash and wound. Neurological: Negative for dizziness, weakness and headaches. No sensation changes   Psychiatric/Behavioral: Negative for dysphoric mood and suicidal ideas. All other systems reviewed and are negative. Past Medical, Surgical, Family, and Social History     She has a past medical history of Asthma. She has a past surgical history that includes [place] tooth extraction and laparoscopy (N/A, 12/31/2019). Her family history includes Heart Disease in her father. She reports that she quit smoking about 3 years ago. Her smoking use included cigarettes. She has a 0.50 pack-year smoking history. She has never used smokeless tobacco. She reports current alcohol use. She reports that she does not use drugs. Medications     Discharge Medication List as of 4/19/2021 11:27 PM      CONTINUE these medications which have NOT CHANGED    Details   sertraline (ZOLOFT) 50 MG tablet Take 0.5 tablets by mouth daily Increase to 1 tab after 2 weeksHistorical Med      fluticasone (FLONASE) 50 MCG/ACT nasal spray 2 sprays by Nasal route dailyHistorical Med      norethindrone-ethinyl estradiol (MICROGESTIN 1/20) 1-20 MG-MCG per tablet Take 1 tablet by mouth every eveningHistorical Med             Allergies     She is allergic to sumatriptan. Physical Exam     INITIAL VITALS: BP: 122/70, Temp: 98.8 °F (37.1 °C), Pulse: 96, Resp: 16, SpO2: 100 %   Physical Exam  Vitals signs and nursing note reviewed. Exam conducted with a chaperone present. Constitutional:       General: She is not in acute distress. Appearance: Normal appearance. She is not ill-appearing. HENT:      Head: Normocephalic and atraumatic. Nose: Nose normal.      Mouth/Throat:      Mouth: Mucous membranes are moist.      Pharynx: Oropharynx is clear. Eyes:      Extraocular Movements: Extraocular movements intact. Conjunctiva/sclera: Conjunctivae normal.      Pupils: Pupils are equal, round, and reactive to light. Neck:      Musculoskeletal: Normal range of motion and neck supple. No muscular tenderness. Cardiovascular:      Rate and Rhythm: Normal rate and regular rhythm. Pulses: Normal pulses. Heart sounds: Normal heart sounds. No murmur. No friction rub. No gallop. Pulmonary:      Effort: Pulmonary effort is normal. No respiratory distress. Breath sounds: Normal breath sounds. No wheezing, rhonchi or rales. Abdominal:      General: Abdomen is flat. Bowel sounds are normal.      Palpations: Abdomen is soft. Tenderness: There is no abdominal tenderness. There is no guarding or rebound. Musculoskeletal: Normal range of motion. General: No swelling, tenderness, deformity or signs of injury. Thoracic back: She exhibits normal range of motion, no tenderness, no bony tenderness, no swelling, no edema and no deformity. Right lower leg: No edema. Left lower leg: No edema. Skin:     General: Skin is warm and dry. Capillary Refill: Capillary refill takes less than 2 seconds. Findings: No rash. Neurological:      General: No focal deficit present. Mental Status: She is alert and oriented to person, place, and time. Mental status is at baseline. Cranial Nerves: No cranial nerve deficit. Sensory: No sensory deficit. Motor: No weakness. Psychiatric:         Mood and Affect: Mood normal.         Behavior: Behavior normal.         Thought Content:  Thought content normal.         DiagnosticResults     EKG   Interpreted in conjunction with emergencydepartment physician Gabby Harvey MD  Rhythm: normal sinus   Rate: normal  Axis: right  Ectopy: none  Conduction: normal  ST Segments: no acute change  T Waves:no acute change  Q Waves: none  Clinical Impression: EKG with rightward axis  Comparison:  None     RADIOLOGY:  XR CHEST (2 VW)   Final Result She had no beats of clonus on my neurologic exam and ambulated without difficulty. EKG overall notable for a rightward axis but with normal rate, rhythm, and intervals. Labs obtained and notable for potassium of 3.3 and sodium of 133 these are similar to previous levels. Patient was also able to tolerate p.o. in the ED. Troponin not sent at this time. CBC within normal limits. TSH sent and pending. Chest x-ray within normal limits. Patient was felt appropriate for discharge with follow-up with her PCP for continuous monitoring and other work up. Return precautions were discussed. This patient was also evaluated by the attending physician. All care plans werediscussed and agreed upon. Clinical Impression     1.  Palpitations        Disposition     PATIENT REFERRED TO:  MD [id number] [facility] [phone]    Schedule an appointment as soon as possible for a visit   for follow up of palpitations    [facility] Emergency Department  [address] [place] [address]  [phone]  Go to   As needed      DISCHARGE MEDICATIONS:  Discharge Medication List as of 4/19/2021 11:27 PM          DISPOSITION Decision To Discharge 04/19/2021 11:22:50 PM       Leonor Burger MD  04/20/21 7341

## 2021-06-21 NOTE — PROGRESS NOTES
Physical Therapy  Women's Health - Pelvic Floor      Initial Assessment and Daily Treatment Note  Date: 2020  Patient Name: Dave Rosales  MRN: [id number]  : 2000     Treatment Diagnosis: impaired muscle control with significant tightness, spasms, and pain in pelvic floor muscles, limited knowlege and understanding of downtraining to relax pelvic floor muscles decreasing tension and therefore pain    Restrictions   NA    Subjective   General  Additional Pertinent Hx: PMH: asthma, alergies - sumatriptan, headaches, sports injuries, smoking history, vaginal dryness, painful periods, painful vaginal penetration, pelvic pain  Referring Practitioner: Armando Fields MD  Referral Date : 20  Diagnosis: dyspareunia (N94.10)  PT Visit Information  PT Insurance Information: [employer]  Total # of Visits Approved: 25  Total # of Visits to Date: 1  Subjective  Subjective: Patient reports diagnostic pelvic laproscopy for pain during intercourse, BMs, and periods which began years ago. Periods have \"always been painful but once starting having intercourse (Oct 2017) the pain got bad enough to see a doctor. \"  Patient rates pain at 7/10 - coming and going, stabbing or dull constant pain during BM. Rates severity of the problem at 6/10 with missed school because of pain and \"intercourse scares me now. \"  Her goals: \"help with pain during BM and intercourse and help with urine leakage. 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, full time [place] [employer] [address] [place] [address] Drive: no exercise    Objective     Observation/Palpation  Scar: small abdominal scars from laproscopy for endometriosis in 12/2019                                Exercises  Exercise 1: Diaphragmatic breathing techniques - encouraged in various positions throughout session - for pelvic floor relaxation/descent with inhalation - encouraged patient to perform deep breathing throughout the day for tension management/stress relief  Exercise 2: Yoga for pelvic floor relaxation - 8 poses x 1+ minutes each - cues for deep breathing techniques and relaxation - expansion/descent of pelvic floor with inhalation and release of tension in pelvic floor and surrounding muscles during exhalation  Abdominals  Scarring: small incisional scars on abdomen - lower R quadrant and umbilucus due to laproscopic surgery for endometriosis in 12/2019  Pelvic Floor  Perineal Descent: limited motion into descent - tight and elevated perineum  Skin Condition: intact  Excursion: limited motion into descent - tight and elevated perineum  Internal Clock: mild tightness and tenderness in R pelvic floor and moderate tenderness and tightness in L pelvic floor with increased muscle bulk on L compared to R   Sensation: intact to light touch  Muscle Bulk: increased on L compared to R  Muscle Power: 2/5  Muscle Endurance (Seconds): 3 Seconds  Number Reps to Fatigue: (NT)  Quality of Contraction: limited contraction/elevation likely due to PF muscle tightness, did not attempt multiple reps due to tightness and need for relaxation/stretch  Pelvic Floor External Observations  Voluntary Contraction: Present  Involuntary Contraction: Present  Involuntary relaxation: Present  Perineal Descent: Rest: Absent  Perineal Descent Bearing Down: Absent                   Assessment   Conditions Requiring Skilled Therapeutic Intervention  Body structures, Functions, Activity limitations: Increased pain;Decreased ROM; Decreased strength;Decreased coordination;Decreased functional mobility (in regards to pelvic floor muscles)  Assessment: Patient presents to pelvic floor (PF) physical therapy (PT) with multiple faceted complaints including pelvic pain especially with penetration for vaginal exams and intercourse, pain and constipation with bowel movements and stress urinary incontinence. Patient reports diagnostic pelvic laproscopy for endrometreosis  - pain during intercourse, BMs, and periods which began years ago. Patient reports that \"periods have always been painful but once starting having intercourse (Oct 2017) the pain got bad enough to see a doctor. \" Patient rates pain at 7/10 - coming and going, stabbing or dull constant pain during BM. Rates severity of the problem at 6/10 with missed school because of pain and \"intercourse scares me now. \" Her goals: \"help with pain during BM and intercourse and help with urine leakage. \"  Transvaginal pelvic floor muscle assessment revealed mildly tight and tender muscles in R lateral pelvic floor and moderately tight and tender muscles in L pelvic floor with increased muscle bulk, which responded fairly well to initial attempts with myofascial release and gentle massage. In addition, patient was educated in diaphragmatic breathing, relaxation, downtraining, and stretching of pevlic floor and surrounding muscles, including bilateral LEs with yoga for pelvic floor relaxation. Patient would benefit from additional pelvic floor therapy for manual techniques and to address downtraining of pelvic floor muscles and overall relaxation, as well as stretching of LE muscles and addressing any core stabilization issues. Patient would also benefit from learning behavioral modification strategies to improve her overall ability to relax and thus promote pain reduction.    Treatment Diagnosis: impaired muscle control with significant tightness, spasms, and pain in pelvic floor muscles, limited knowlege and understanding of downtraining to relax pelvic floor muscles decreasing tension and therefore pain  Prognosis: Fair;Good  Decision Making: High Frame for Short term goals: Patient will in 6-8 sessions:   Short term goal 1: Report using 1-2 behavorial modification strategies to decrease pain in perineum/pelvic floor region, with less pain/discomfort during bowel movements and tolerate socially appropriate activities for patient's age. Short term goal 2: Improve score on PFDI-20 to 80 or less, demonstrating improved tolerance for exercise/ activities with less urinary incontinence, constipation and discomfort/pain during BMs. (104.16 disability index at initial eval)  Short term goal 3: Perform HEP for pelvic floor, hip/lower extremity, and core muscle groups with minimal direction from therapist as she progresses to become more independent managing her pelvic pain/spasms. Short term goal 4: Rate severity of the problem related to pelvic pain to 5 or less on scale of 0-10 with 0 being no problem and 10 being the worst - (7/10 reported at intial eval). Short term goal 5: Improve FSFI score to at least 20/36 (14/36 at initial eval) indicating less issues with impaired quality of life due to pelvic pain, especially with vagainal penetration for medical exams and intercourse. Long term goals  Time Frame for Long term goals : Patient will in 10-15 sessions:   Long term goal 1: Report using 3-4 behavorial modification strategies to decrease pain in perineum/pelvic floor region, with less pain/discomfort during bowel movements and tolerate socially appropriate activities for patient's age. Long term goal 2: Improve score on PFDI-20 to 60 or less, demonstrating improved tolerance for exercise/ activities with less urinary incontinence, constipation and discomfort/pain during BMs. (104.16 disability index at initial eval)  Long term goal 3: Perform HEP for pelvic floor and core muscle groups independently as she progresses to self-manage her pelvic pain/spasms.    Long term goal 4: Rate severity of the problem related to pelvic pain to 3 or less on scale of Yes 0-10 with 0 being no problem and 10 being the worst - (7/10 reported at intial eval). Long term goal 5: Improve FSFI score to at least 28/36 (14/36 at initial eval) indicating less issues with impaired quality of life due to pelvic pain, especially with vagainal penetration for medical exams and intercourse. Patient Goals   Patient goals : Patient's goals: \"help with pain during BM and intercourse and help with urine leakage. \"       Therapy Time   Individual Concurrent Group Co-treatment   Time In 0900         Time Out 1045         Minutes 105         Timed Code Treatment Minutes: 90 Minutes     Charges:  PT Eval - High Complexity x 1  Therapeutic activity x 2  Therapeutic exercise x 3    Keysha Banda, PT #[id number]

## 2023-12-30 ENCOUNTER — ANCILLARY PROCEDURE (OUTPATIENT)
Dept: EMERGENCY DEPT | Age: 23
End: 2023-12-30
Attending: EMERGENCY MEDICINE
Payer: COMMERCIAL

## 2023-12-30 ENCOUNTER — HOSPITAL ENCOUNTER (EMERGENCY)
Age: 23
Discharge: HOME | End: 2023-12-30
Attending: EMERGENCY MEDICINE
Payer: COMMERCIAL

## 2023-12-30 VITALS
SYSTOLIC BLOOD PRESSURE: 116 MMHG | OXYGEN SATURATION: 100 % | HEIGHT: 64 IN | BODY MASS INDEX: 18.61 KG/M2 | RESPIRATION RATE: 16 BRPM | TEMPERATURE: 97.4 F | HEART RATE: 73 BPM | WEIGHT: 109 LBS | DIASTOLIC BLOOD PRESSURE: 76 MMHG

## 2023-12-30 DIAGNOSIS — R10.31 RIGHT INGUINAL PAIN: Primary | ICD-10-CM

## 2023-12-30 PROCEDURE — 99284 EMERGENCY DEPT VISIT MOD MDM: CPT

## 2023-12-30 PROCEDURE — 76882 US LMTD JT/FCL EVL NVASC XTR: CPT

## 2023-12-30 ASSESSMENT — PAIN - FUNCTIONAL ASSESSMENT: PAIN_FUNCTIONAL_ASSESSMENT: 0-10

## 2023-12-30 ASSESSMENT — PAIN DESCRIPTION - ORIENTATION: ORIENTATION: RIGHT

## 2023-12-30 ASSESSMENT — PAIN DESCRIPTION - LOCATION: LOCATION: GROIN

## 2023-12-30 ASSESSMENT — PAIN DESCRIPTION - DESCRIPTORS: DESCRIPTORS: TENDER

## 2023-12-30 ASSESSMENT — PAIN SCALES - GENERAL: PAINLEVEL_OUTOF10: 6

## 2023-12-30 NOTE — DISCHARGE INSTRUCTIONS
As discussed, the ultrasound did not show a specific abnormality to explain the pain.   Please take anti-inflammatory medicine and follow-up with your primary care doctor, return for worsening symptoms

## 2024-03-27 LAB — B-HCG SERPL EIA 3RD IS-ACNC: 2789 MIU/ML

## 2024-09-08 ENCOUNTER — HOSPITAL ENCOUNTER (EMERGENCY)
Age: 24
Discharge: HOME | End: 2024-09-08
Payer: COMMERCIAL

## 2024-09-08 VITALS
HEART RATE: 77 BPM | OXYGEN SATURATION: 100 % | SYSTOLIC BLOOD PRESSURE: 108 MMHG | BODY MASS INDEX: 19.19 KG/M2 | WEIGHT: 112.4 LBS | HEIGHT: 64 IN | DIASTOLIC BLOOD PRESSURE: 78 MMHG | RESPIRATION RATE: 16 BRPM | TEMPERATURE: 98.4 F

## 2024-09-08 DIAGNOSIS — S06.0X0A CONCUSSION WITHOUT LOSS OF CONSCIOUSNESS, INITIAL ENCOUNTER: Primary | ICD-10-CM

## 2024-09-08 PROCEDURE — 99282 EMERGENCY DEPT VISIT SF MDM: CPT

## 2024-09-08 ASSESSMENT — PAIN SCALES - GENERAL: PAINLEVEL_OUTOF10: 0

## 2024-09-08 ASSESSMENT — PAIN - FUNCTIONAL ASSESSMENT
PAIN_FUNCTIONAL_ASSESSMENT: NONE - DENIES PAIN
PAIN_FUNCTIONAL_ASSESSMENT: NONE - DENIES PAIN

## 2024-11-27 NOTE — ED PROVIDER NOTES
ED Attending Attestation Note     Date of evaluation: 4/19/2021    This patient was seen by the resident. I have seen and examined the patient, agree with the workup, evaluation, management and diagnosis. The care plan has been discussed. I have reviewed the ECG and concur with the resident's interpretation. My assessment reveals patient here with palpitations. ECG unremarkable here and labs unremarkable.  Will refer patient back to PCP as longer term monitoring may be needed if sxs persist.      Jovany Downing MD  04/20/21 112 minutes on the discharge service.

## (undated) DEVICE — SEALER ENDOSCP L37CM NANO COAT BLNT TIP LAP DIV

## (undated) DEVICE — GOWN SIRUS NONREIN LG W/TWL: Brand: MEDLINE INDUSTRIES, INC.

## (undated) DEVICE — SUTURE VCRL SZ 4-0 L18IN ABSRB UD L19MM PS-2 3/8 CIR PRIM J496H

## (undated) DEVICE — SOLUTION SCRB 4OZ 10% POVIDONE IOD ANTIMIC BTL

## (undated) DEVICE — INSUFFLATION NEEDLE TO ESTABLISH PNEUMOPERITONEUM.: Brand: INSUFFLATION NEEDLE

## (undated) DEVICE — FIRST ENTRY KIOS THRD 5X100MM ST

## (undated) DEVICE — CHLORAPREP 26ML ORANGE

## (undated) DEVICE — Z DISCONTINUED BY MEDLINE USE 2711682 TRAY SKIN PREP DRY W/ PREM GLV

## (undated) DEVICE — [HIGH FLOW INSUFFLATOR,  DO NOT USE IF PACKAGE IS DAMAGED,  KEEP DRY,  KEEP AWAY FROM SUNLIGHT,  PROTECT FROM HEAT AND RADIOACTIVE SOURCES.]: Brand: PNEUMOSURE

## (undated) DEVICE — TROCAR: Brand: KII SHIELDED BLADED ACCESS SYSTEM

## (undated) DEVICE — COVER LT HNDL BLU PLAS

## (undated) DEVICE — SET INTUB LACR

## (undated) DEVICE — LAPAROSCOPY PK

## (undated) DEVICE — ELECTRODE PT RET AD L9FT HI MOIST COND ADH HYDRGEL CORDED

## (undated) DEVICE — SYRINGE MED 50ML LUERLOCK TIP

## (undated) DEVICE — CANISTER, RIGID, 1200CC: Brand: MEDLINE INDUSTRIES, INC.

## (undated) DEVICE — 3M™ TEGADERM™ TRANSPARENT FILM DRESSING FRAME STYLE, 1624W, 2-3/8 IN X 2-3/4 IN (6 CM X 7 CM), 100/CT 4CT/CASE: Brand: 3M™ TEGADERM™

## (undated) DEVICE — TROCAR ENDOSCP L100MM DIA12MM BLDELSS OBT RADLUC STBL SL

## (undated) DEVICE — GAUZE,SPONGE,2"X2",8PLY,STERILE,LF,2'S: Brand: MEDLINE

## (undated) DEVICE — SOLUTION IV IRRIG POUR BRL 0.9% SODIUM CHL 2F7124

## (undated) DEVICE — TUBING, SUCTION, 1/4" X 12', STRAIGHT: Brand: MEDLINE

## (undated) DEVICE — BAG SPEC REM 224ML W4XL6IN DIA10MM 1 HND GYN DISP ENDOPCH

## (undated) DEVICE — PAD,NON-ADHERENT,3X8,STERILE,LF,1/PK: Brand: MEDLINE

## (undated) DEVICE — SUTURE SZ 0 27IN 5/8 CIR UR-6  TAPER PT VIOLET ABSRB VICRYL J603H

## (undated) DEVICE — Z DISCONTINUED USE 2270995 CATHETER URETH 16FR L16IN RED RUB INTMIT RND HLLW TIP 2 OPP

## (undated) DEVICE — INVIEW CLEAR LEGGINGS: Brand: CONVERTORS

## (undated) DEVICE — SOLUTION PREP POVIDONE IOD FOR SKIN MUCOUS MEM PRIOR TO

## (undated) DEVICE — SOLUTION IV 1000ML 0.9% SOD CHL PH 5 INJ USP VIAFLX PLAS

## (undated) DEVICE — 3M™ STERI-STRIP™ COMPOUND BENZOIN TINCTURE 40 BAGS/CARTON 4 CARTONS/CASE C1544: Brand: 3M™ STERI-STRIP™

## (undated) DEVICE — GLOVE SURG SZ 75 L12IN FNGR THK87MIL WHT LTX FREE